# Patient Record
Sex: FEMALE | Race: WHITE | NOT HISPANIC OR LATINO | Employment: FULL TIME | URBAN - NONMETROPOLITAN AREA
[De-identification: names, ages, dates, MRNs, and addresses within clinical notes are randomized per-mention and may not be internally consistent; named-entity substitution may affect disease eponyms.]

---

## 2020-01-08 ENCOUNTER — OFFICE VISIT (OUTPATIENT)
Dept: FAMILY MEDICINE CLINIC | Facility: CLINIC | Age: 55
End: 2020-01-08

## 2020-01-08 VITALS
OXYGEN SATURATION: 94 % | HEIGHT: 65 IN | SYSTOLIC BLOOD PRESSURE: 130 MMHG | BODY MASS INDEX: 39.65 KG/M2 | TEMPERATURE: 98.1 F | WEIGHT: 238 LBS | HEART RATE: 61 BPM | DIASTOLIC BLOOD PRESSURE: 78 MMHG

## 2020-01-08 DIAGNOSIS — K59.00 CONSTIPATION, UNSPECIFIED CONSTIPATION TYPE: ICD-10-CM

## 2020-01-08 DIAGNOSIS — A08.4 VIRAL GASTROENTERITIS: Primary | ICD-10-CM

## 2020-01-08 DIAGNOSIS — R68.83 CHILLS: ICD-10-CM

## 2020-01-08 LAB
FLUAV AG NPH QL: NEGATIVE
FLUBV AG NPH QL IA: NEGATIVE

## 2020-01-08 PROCEDURE — 87804 INFLUENZA ASSAY W/OPTIC: CPT | Performed by: INTERNAL MEDICINE

## 2020-01-08 PROCEDURE — 99213 OFFICE O/P EST LOW 20 MIN: CPT | Performed by: INTERNAL MEDICINE

## 2020-01-08 RX ORDER — OMEPRAZOLE 40 MG/1
40 CAPSULE, DELAYED RELEASE ORAL DAILY
Qty: 30 CAPSULE | Refills: 0 | Status: SHIPPED | OUTPATIENT
Start: 2020-01-08

## 2020-01-08 RX ORDER — ONDANSETRON 4 MG/1
4 TABLET, FILM COATED ORAL EVERY 8 HOURS PRN
Qty: 30 TABLET | Refills: 0 | Status: SHIPPED | OUTPATIENT
Start: 2020-01-08

## 2020-01-08 NOTE — PROGRESS NOTES
"Subjective        History of Present Illness     Bhumi Stringer is a 54 y.o. female who is a regular patient of Dr. Marcano who comes in reporting onset of nausea on Saturday progressing to a headache and body aches.  She continues to feel nauseous.  She has not been eating much and has not had a bowel movement since Friday, although, she normally doesn't struggle with constipation.  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  Her  is with her today and has similar symptoms.  They also have two adult sons living in the home with them who have been ill.  Influenza screen is negative for Type A and B influenza.  She has taken Tylenol and Zofran for symptomatic relief.  She missed Monday and Tuesday of work this week due to illness.       Review of Systems   Constitutional: Positive for chills. Negative for fatigue and fever.   HENT: Negative for congestion, ear pain, postnasal drip, sinus pressure and sore throat.    Respiratory: Negative for cough, shortness of breath and wheezing.    Cardiovascular: Negative for chest pain, palpitations and leg swelling.   Gastrointestinal: Positive for constipation and nausea. Negative for abdominal pain, blood in stool, diarrhea and vomiting.   Endocrine: Negative for cold intolerance, heat intolerance, polydipsia and polyuria.   Genitourinary: Negative for dysuria, frequency, hematuria and urgency.   Skin: Negative for rash.   Neurological: Negative for syncope.        Objective     Visit Vitals  /78   Pulse 61   Temp 98.1 °F (36.7 °C) (Oral)   Ht 165.1 cm (65\")   Wt 108 kg (238 lb)   SpO2 94%   Breastfeeding No   BMI 39.61 kg/m²         Physical Exam   Constitutional: She is oriented to person, place, and time. She appears well-developed and well-nourished. No distress.   Obese female.  Accompanied by her .   HENT:   Head: Normocephalic and atraumatic.   Nose: Nose normal.   Mouth/Throat: Oropharynx is clear and moist. No " oropharyngeal exudate.   Eyes: Pupils are equal, round, and reactive to light. EOM are normal.   Neck: Neck supple. No JVD present. No thyromegaly present.   Cardiovascular: Normal rate, regular rhythm and normal heart sounds.   Pulmonary/Chest: Effort normal and breath sounds normal. No accessory muscle usage. No respiratory distress. She has no wheezes. She has no rales.   Abdominal: Soft. Bowel sounds are normal. She exhibits no distension. There is tenderness.   Obese abdomen. Epigastric and mild upper quadrant tenderness.  No guarding or rebound.    Musculoskeletal: She exhibits no edema.   Lymphadenopathy:     She has no cervical adenopathy.   Neurological: She is alert and oriented to person, place, and time. No cranial nerve deficit.   Psychiatric: She has a normal mood and affect. Her speech is normal and behavior is normal. Judgment and thought content normal.         Assessment/Plan      Influenza screen is negative for Type A and B influenza.   Recommended a bland diet for the next few days gradually progressing to a regular diet.  Stay well hydrated.  A prescription is sent for Zofran for nausea.  She can continue with the Tylenol for symptomatic relief.     Prescription is sent for Prilosec 40 mg to take one q.d. Recommended OTC glycerin suppositories and small amount of Milk of magnesia to help with the acute constipation.      She is given work excuse for Monday and Tuesday.  She is not scheduled to return until Saturday.      Return p.r.n.    Scribed for Dr. Hernandez by Bessy Mcdonald [facility].     There are no diagnoses linked to this encounter.    No visits with results within 3 Week(s) from this visit.   Latest known visit with results is:   Hospital Outpatient Visit on 03/03/2016   Component Date Value Ref Range Status   • Color, UA 03/03/2016 Yellow   Final   • Appearance 03/03/2016 Cloudy   Final   • Glucose, Urine 03/03/2016 NEGATIVE  NEGATIVE mg/dl Final   • Ketones, UA 03/03/2016 NEGATIVE   NEGATIVE Final   • Specific Gravity, UA 03/03/2016 1.020  1.003 - 1.030 Final   • Blood, UA 03/03/2016 2+* NEGATIVE Final   • pH, UA 03/03/2016 7.0* 5.5 - 8.0 pH Units Final    pH Normal: 5.0 - 9.0    • Protein, UA 03/03/2016 2+* NEGATIVE Final   • Urobilinogen, UA 03/03/2016 0.2  0.2 EU/dl Final   • Nitrite, UA 03/03/2016 NEGATIVE  NEGATIVE Final   • Leukocytes, UA 03/03/2016 3+* NEGATIVE Final   • WBC, UA 03/03/2016 31-50* 0-5,0,Rare,1-2,2-4,3-5  /HPF Final   • RBC, UA 03/03/2016 31-50* 0-2,0,Rare  /HPF Final   • Bacteria, UA 03/03/2016 2+* 0,TRACE  /HPF Final   • Mucus, UA 03/03/2016 2+   Final   • Epithelial Cells, UA 03/03/2016 13-20  Squamous   /HPF Final   ]